# Patient Record
Sex: MALE | Race: WHITE | NOT HISPANIC OR LATINO | URBAN - METROPOLITAN AREA
[De-identification: names, ages, dates, MRNs, and addresses within clinical notes are randomized per-mention and may not be internally consistent; named-entity substitution may affect disease eponyms.]

---

## 2024-04-27 PROBLEM — Z76.89 ENCOUNTER TO ESTABLISH CARE WITH NEW DOCTOR: Status: ACTIVE | Noted: 2024-04-27

## 2024-04-27 NOTE — PROGRESS NOTES
"Establish Care, Shoulder Pain (Right shoulder), Knee Pain (Right knee pain), and Foot Injury (Left heel pain)       HPI:    Patient presents to establish care.      Current Outpatient Medications   Medication Instructions    aspirin (ECOTRIN) 81 mg, Oral, Daily    cetirizine (ZYRTEC) 10 mg, Oral, Daily PRN    ferrous sulfate (FEOSOL) 325 mg, Oral, With breakfast    hydrALAZINE (APRESOLINE) 50 mg, Oral, Every 12 hours    labetaloL (NORMODYNE) 300 mg, Oral, Every 12 hours    levothyroxine (SYNTHROID) 112 mcg, Oral, Before breakfast    meloxicam (MOBIC) 15 mg, Oral, Daily    metFORMIN (GLUCOPHAGE) 1,000 mg, Oral, 2 times daily with meals    olmesartan-hydrochlorothiazide (BENICAR HCT) 40-12.5 mg Tab 1 tablet, Oral, Daily    pravastatin (PRAVACHOL) 40 mg, Oral, Daily    tamsulosin HCl (TAMSULOSIN ORAL) 0.4 mg, Oral, Nightly         ROS:    Pt with chronic left ankle issues; MRI 2019 revealed numerous chronic ligament tears as well as chronic tendonosis of left Achilles tendon.  Patient with history of Achilles tear.    Patient with post heel pain x2 weeks.    He also has chronic right shoulder pain and and chronic right knee pain. History of bilateral knee surgery in 1977.    Patient requests referral for above issues.      PE:    ..Visit Vitals  BP (!) 142/78   Pulse 63   Temp 98 °F (36.7 °C)   Ht 6' 3" (1.905 m)   Wt 115.7 kg (255 lb)   SpO2 98%   BMI 31.87 kg/m²        General:  He is well-developed well-nourished male in no apparent distress.  He is alert and oriented.  His affect is appropriate.    Left ankle: Diffusely swollen.  Tender to palpation over Achilles insertion site.  Patient able to dorsiflex and plantar flex at ankle without discomfort.  Chest: Clear to auscultation bilaterally.    CV: Regular rate rhythm without murmurs rubs or gallops.  Right shoulder exam deferred.    Right knee exam deferred.        1. Encounter to establish care with new doctor  Overview:  Patient presents to establish care.  "   Patient reports chronic left ankle, right knee and right shoulder pain; he desires referral to orthopedist.  Referrals made and x-rays ordered.  Medical history reviewed; we will review and further detail at wellness exam in 2 weeks.      2. Chronic pain of left ankle  Overview:  Due to acute left heel pain, x-rays ordered.  Will refer to ortho for further evaluation.    Orders:  -     Ambulatory referral/consult to Orthopedics; Future; Expected date: 05/07/2024  -     X-Ray Calcaneus 2 View Left; Future; Expected date: 04/30/2024    3. Chronic right shoulder pain  Overview:  Patient with chronic right shoulder pain; x-rays ordered.    Refer to Dr Ladarius Sage for further evaluation.    Orders:  -     Ambulatory referral/consult to Orthopedics; Future; Expected date: 05/07/2024  -     X-ray Shoulder 2 or More Views Right; Future; Expected date: 04/30/2024    4. Chronic pain of right knee  Overview:  MRI from 2019 revealed end-stage osteoarthritis as well as pathology involving medial meniscus and lateral collateral ligaments.  X-rays ordered.  Refer to Dr. Ladarius Sage for further evaluation.    Orders:  -     Ambulatory referral/consult to Orthopedics; Future; Expected date: 05/07/2024  -     X-Ray Knee 3 View Right; Future; Expected date: 04/30/2024    5. Primary hypertension  Overview:  His blood pressure is slightly elevated today.    Patient has been compliant with medications.  Patient advised to monitor blood pressures at home.    Recheck in 2 weeks at wellness examination.      6. Pure hypercholesterolemia  Overview:  Patient is on Pravachol.    Continue low-cholesterol/low-fat diet.    Check lipid profile at upcoming wellness exam.      7. Hypothyroidism, unspecified type  Overview:  Patient has been compliant with medication.    Will check TSH at wellness exam in 2 weeks.      8. Type 2 diabetes mellitus without complication, without long-term current use of insulin              ..Follow up in about 2 weeks  (around 5/14/2024) for Wellness.       Future Appointments   Date Time Provider Department Center   5/15/2024 10:45 AM Brooks Nguyen MD [facility]

## 2024-04-30 ENCOUNTER — OFFICE VISIT (OUTPATIENT)
Dept: PRIMARY CARE CLINIC | Facility: CLINIC | Age: 69
End: 2024-04-30
Payer: COMMERCIAL

## 2024-04-30 VITALS
OXYGEN SATURATION: 98 % | SYSTOLIC BLOOD PRESSURE: 142 MMHG | DIASTOLIC BLOOD PRESSURE: 78 MMHG | HEART RATE: 63 BPM | BODY MASS INDEX: 31.71 KG/M2 | HEIGHT: 75 IN | WEIGHT: 255 LBS | TEMPERATURE: 98 F

## 2024-04-30 DIAGNOSIS — Z76.89 ENCOUNTER TO ESTABLISH CARE WITH NEW DOCTOR: Primary | ICD-10-CM

## 2024-04-30 DIAGNOSIS — M25.561 CHRONIC PAIN OF RIGHT KNEE: ICD-10-CM

## 2024-04-30 DIAGNOSIS — M25.511 CHRONIC RIGHT SHOULDER PAIN: ICD-10-CM

## 2024-04-30 DIAGNOSIS — G89.29 CHRONIC RIGHT SHOULDER PAIN: ICD-10-CM

## 2024-04-30 DIAGNOSIS — M25.572 CHRONIC PAIN OF LEFT ANKLE: ICD-10-CM

## 2024-04-30 DIAGNOSIS — G89.29 CHRONIC PAIN OF RIGHT KNEE: ICD-10-CM

## 2024-04-30 DIAGNOSIS — I10 PRIMARY HYPERTENSION: ICD-10-CM

## 2024-04-30 DIAGNOSIS — G89.29 CHRONIC PAIN OF LEFT ANKLE: ICD-10-CM

## 2024-04-30 DIAGNOSIS — E78.00 PURE HYPERCHOLESTEROLEMIA: ICD-10-CM

## 2024-04-30 DIAGNOSIS — E03.9 HYPOTHYROIDISM, UNSPECIFIED TYPE: ICD-10-CM

## 2024-04-30 DIAGNOSIS — E11.9 TYPE 2 DIABETES MELLITUS WITHOUT COMPLICATION, WITHOUT LONG-TERM CURRENT USE OF INSULIN: ICD-10-CM

## 2024-04-30 PROCEDURE — 1159F MED LIST DOCD IN RCRD: CPT | Mod: CPTII,,, | Performed by: FAMILY MEDICINE

## 2024-04-30 PROCEDURE — 3078F DIAST BP <80 MM HG: CPT | Mod: CPTII,,, | Performed by: FAMILY MEDICINE

## 2024-04-30 PROCEDURE — 3008F BODY MASS INDEX DOCD: CPT | Mod: CPTII,,, | Performed by: FAMILY MEDICINE

## 2024-04-30 PROCEDURE — 99205 OFFICE O/P NEW HI 60 MIN: CPT | Mod: ,,, | Performed by: FAMILY MEDICINE

## 2024-04-30 PROCEDURE — 3288F FALL RISK ASSESSMENT DOCD: CPT | Mod: CPTII,,, | Performed by: FAMILY MEDICINE

## 2024-04-30 PROCEDURE — 3077F SYST BP >= 140 MM HG: CPT | Mod: CPTII,,, | Performed by: FAMILY MEDICINE

## 2024-04-30 PROCEDURE — 1125F AMNT PAIN NOTED PAIN PRSNT: CPT | Mod: CPTII,,, | Performed by: FAMILY MEDICINE

## 2024-04-30 PROCEDURE — 1160F RVW MEDS BY RX/DR IN RCRD: CPT | Mod: CPTII,,, | Performed by: FAMILY MEDICINE

## 2024-04-30 PROCEDURE — 1101F PT FALLS ASSESS-DOCD LE1/YR: CPT | Mod: CPTII,,, | Performed by: FAMILY MEDICINE

## 2024-04-30 RX ORDER — METFORMIN HYDROCHLORIDE 1000 MG/1
1000 TABLET ORAL 2 TIMES DAILY WITH MEALS
COMMUNITY

## 2024-04-30 RX ORDER — MELOXICAM 15 MG/1
15 TABLET ORAL DAILY
COMMUNITY

## 2024-04-30 RX ORDER — OLMESARTAN MEDOXOMIL AND HYDROCHLOROTHIAZIDE 40/12.5 40; 12.5 MG/1; MG/1
1 TABLET ORAL DAILY
COMMUNITY

## 2024-04-30 RX ORDER — LEVOTHYROXINE SODIUM 112 UG/1
112 TABLET ORAL
COMMUNITY

## 2024-04-30 RX ORDER — CETIRIZINE HYDROCHLORIDE 10 MG/1
10 TABLET ORAL DAILY PRN
COMMUNITY

## 2024-04-30 RX ORDER — PRAVASTATIN SODIUM 40 MG/1
40 TABLET ORAL DAILY
COMMUNITY

## 2024-04-30 RX ORDER — LABETALOL 300 MG/1
300 TABLET, FILM COATED ORAL EVERY 12 HOURS
COMMUNITY

## 2024-04-30 RX ORDER — ASPIRIN 81 MG/1
81 TABLET ORAL DAILY
COMMUNITY

## 2024-04-30 RX ORDER — HYDRALAZINE HYDROCHLORIDE 10 MG/1
50 TABLET, FILM COATED ORAL EVERY 12 HOURS
COMMUNITY

## 2024-04-30 RX ORDER — FERROUS SULFATE 325(65) MG
325 TABLET ORAL
COMMUNITY

## 2024-05-01 ENCOUNTER — TELEPHONE (OUTPATIENT)
Dept: PRIMARY CARE CLINIC | Facility: CLINIC | Age: 69
End: 2024-05-01
Payer: MEDICARE

## 2024-05-01 NOTE — TELEPHONE ENCOUNTER
----- Message from João Ferreira sent at 4/30/2024  3:24 PM CDT -----  .Who Called: No patient name on file.    Caller is requesting information on test results.    Name of Test (Lab/Mammo/Etc): xray  Date of Test: 04/30/24  Where the test was performed:   Ordering Provider:     Preferred Method of Contact: Phone Call  Patient's Preferred Phone Number on File: [phone]  Best Call Back Number, if different:  Additional Information:

## 2024-05-01 NOTE — TELEPHONE ENCOUNTER
Requesting Xray be forwarded to Dr Dean Wolf with Ortho. Instructed pt Dr pitts out of office laila am

## 2024-05-11 PROBLEM — E11.9 TYPE 2 DIABETES MELLITUS WITHOUT COMPLICATION, WITHOUT LONG-TERM CURRENT USE OF INSULIN: Status: ACTIVE | Noted: 2024-05-11

## 2024-05-11 PROBLEM — Z00.00 ENCOUNTER FOR WELLNESS EXAMINATION IN ADULT: Status: ACTIVE | Noted: 2024-05-11

## 2024-05-15 ENCOUNTER — TELEPHONE (OUTPATIENT)
Dept: PRIMARY CARE CLINIC | Facility: CLINIC | Age: 69
End: 2024-05-15

## 2024-05-15 NOTE — TELEPHONE ENCOUNTER
----- Message from Brooks Nguyen MD sent at 5/1/2024  4:20 PM CDT -----  Note:  This is the 1st of 3 messages regarding his x-ray results.  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  Right distal rotator cuff calcific tendinosis.  Please forward results to Dr. Ladarius Sage, [facility] ortho.